# Patient Record
Sex: FEMALE | Race: WHITE | NOT HISPANIC OR LATINO | Employment: UNEMPLOYED | URBAN - METROPOLITAN AREA
[De-identification: names, ages, dates, MRNs, and addresses within clinical notes are randomized per-mention and may not be internally consistent; named-entity substitution may affect disease eponyms.]

---

## 2017-04-19 ENCOUNTER — PREPPED CHART (OUTPATIENT)
Dept: URBAN - METROPOLITAN AREA CLINIC 39 | Facility: CLINIC | Age: 72
End: 2017-04-19

## 2018-04-18 ENCOUNTER — ESTABLISHED COMPREHENSIVE EXAM (OUTPATIENT)
Dept: URBAN - METROPOLITAN AREA CLINIC 39 | Facility: CLINIC | Age: 73
End: 2018-04-18

## 2018-04-18 DIAGNOSIS — H25.811: ICD-10-CM

## 2018-04-18 DIAGNOSIS — H43.812: ICD-10-CM

## 2018-04-18 DIAGNOSIS — H16.223: ICD-10-CM

## 2018-04-18 DIAGNOSIS — H53.2: ICD-10-CM

## 2018-04-18 DIAGNOSIS — H25.812: ICD-10-CM

## 2018-04-18 DIAGNOSIS — H40.013: ICD-10-CM

## 2018-04-18 PROCEDURE — G8427 DOCREV CUR MEDS BY ELIG CLIN: HCPCS

## 2018-04-18 PROCEDURE — 92015 DETERMINE REFRACTIVE STATE: CPT

## 2018-04-18 PROCEDURE — 92014 COMPRE OPH EXAM EST PT 1/>: CPT

## 2018-04-18 PROCEDURE — G8785 BP SCRN NO PERF AT INTERVAL: HCPCS

## 2018-04-18 PROCEDURE — 1036F TOBACCO NON-USER: CPT

## 2018-04-18 ASSESSMENT — KERATOMETRY
OD_AXISANGLE2_DEGREES: 056
OD_AXISANGLE_DEGREES: 146
OS_K2POWER_DIOPTERS: 44.25
OS_K1POWER_DIOPTERS: 44.00
OS_AXISANGLE_DEGREES: 014
OD_K1POWER_DIOPTERS: 43.50
OS_AXISANGLE2_DEGREES: 104
OD_K2POWER_DIOPTERS: 44.25

## 2018-04-18 ASSESSMENT — VISUAL ACUITY
OD_CC: J1
OS_CC: 20/25-1
OU_CC: J1
OD_CC: 20/60
OS_SC: J1+
OD_SC: J1
OU_CC: 20/20
OS_CC: <J12

## 2018-04-18 ASSESSMENT — TONOMETRY
OS_IOP_MMHG: 11
OD_IOP_MMHG: 11

## 2018-06-26 NOTE — PATIENT DISCUSSION
Glasses will not give pt the best vision. He could try to RGP contacts and that will give him better vision than contacts.

## 2018-10-17 ENCOUNTER — IOP CHECK (OUTPATIENT)
Dept: URBAN - METROPOLITAN AREA CLINIC 39 | Facility: CLINIC | Age: 73
End: 2018-10-17

## 2018-10-17 DIAGNOSIS — H40.013: ICD-10-CM

## 2018-10-17 PROCEDURE — G8427 DOCREV CUR MEDS BY ELIG CLIN: HCPCS

## 2018-10-17 PROCEDURE — G9903 PT SCRN TBCO ID AS NON USER: HCPCS

## 2018-10-17 PROCEDURE — 1036F TOBACCO NON-USER: CPT

## 2018-10-17 PROCEDURE — 92083 EXTENDED VISUAL FIELD XM: CPT

## 2018-10-17 PROCEDURE — 92012 INTRM OPH EXAM EST PATIENT: CPT

## 2018-10-17 PROCEDURE — G8785 BP SCRN NO PERF AT INTERVAL: HCPCS

## 2018-10-17 ASSESSMENT — KERATOMETRY
OD_K2POWER_DIOPTERS: 44.25
OS_K2POWER_DIOPTERS: 44.25
OS_AXISANGLE2_DEGREES: 104
OD_AXISANGLE2_DEGREES: 056
OD_AXISANGLE_DEGREES: 146
OS_K1POWER_DIOPTERS: 44.00
OD_K1POWER_DIOPTERS: 43.50
OS_AXISANGLE_DEGREES: 014

## 2018-10-17 ASSESSMENT — TONOMETRY
OD_IOP_MMHG: 12
OS_IOP_MMHG: 12

## 2019-05-20 ASSESSMENT — KERATOMETRY
OD_K2POWER_DIOPTERS: 44.25
OS_AXISANGLE_DEGREES: 014
OD_AXISANGLE2_DEGREES: 056
OS_AXISANGLE2_DEGREES: 104
OS_K1POWER_DIOPTERS: 44.00
OS_K2POWER_DIOPTERS: 44.25
OD_AXISANGLE_DEGREES: 146
OD_K1POWER_DIOPTERS: 43.50

## 2019-05-21 ENCOUNTER — ESTABLISHED COMPREHENSIVE EXAM (OUTPATIENT)
Dept: URBAN - METROPOLITAN AREA CLINIC 39 | Facility: CLINIC | Age: 74
End: 2019-05-21

## 2019-05-21 DIAGNOSIS — H43.812: ICD-10-CM

## 2019-05-21 DIAGNOSIS — H40.013: ICD-10-CM

## 2019-05-21 DIAGNOSIS — H52.4: ICD-10-CM

## 2019-05-21 DIAGNOSIS — H16.223: ICD-10-CM

## 2019-05-21 DIAGNOSIS — H52.203: ICD-10-CM

## 2019-05-21 DIAGNOSIS — H52.13: ICD-10-CM

## 2019-05-21 DIAGNOSIS — H25.811: ICD-10-CM

## 2019-05-21 DIAGNOSIS — H25.812: ICD-10-CM

## 2019-05-21 PROCEDURE — 92014 COMPRE OPH EXAM EST PT 1/>: CPT

## 2019-05-21 PROCEDURE — 92015 DETERMINE REFRACTIVE STATE: CPT

## 2019-05-21 PROCEDURE — 92133 CPTRZD OPH DX IMG PST SGM ON: CPT

## 2019-05-21 PROCEDURE — 92310-1 LEVEL 1 CONTACT LENS MANAGEMENT

## 2019-05-21 ASSESSMENT — VISUAL ACUITY
OS_SC: J1+
OU_CC: 20/20-2
OD_SC: 20/400
OS_CC: J1
OD_CC: 20/25
OS_CC: 20/20-2
OD_CC: J3
OD_SC: J2
OS_SC: 20/400

## 2019-05-21 ASSESSMENT — TONOMETRY
OS_IOP_MMHG: 13
OD_IOP_MMHG: 13

## 2019-07-05 ASSESSMENT — KERATOMETRY
OD_K2POWER_DIOPTERS: 44.25
OS_K2POWER_DIOPTERS: 44.25
OS_AXISANGLE2_DEGREES: 104
OD_AXISANGLE_DEGREES: 146
OS_K1POWER_DIOPTERS: 44.00
OD_K1POWER_DIOPTERS: 43.50
OS_AXISANGLE_DEGREES: 014
OD_AXISANGLE2_DEGREES: 056

## 2019-07-08 ENCOUNTER — IOP CHECK (OUTPATIENT)
Dept: URBAN - METROPOLITAN AREA CLINIC 40 | Facility: CLINIC | Age: 74
End: 2019-07-08

## 2019-07-08 DIAGNOSIS — H40.013: ICD-10-CM

## 2019-07-08 PROCEDURE — 92083 EXTENDED VISUAL FIELD XM: CPT

## 2019-07-08 PROCEDURE — 92250 FUNDUS PHOTOGRAPHY W/I&R: CPT

## 2019-07-08 PROCEDURE — 92012 INTRM OPH EXAM EST PATIENT: CPT

## 2019-07-08 ASSESSMENT — TONOMETRY
OD_IOP_MMHG: 13
OS_IOP_MMHG: 13

## 2019-07-08 ASSESSMENT — VISUAL ACUITY
OD_CC: 20/30
OS_CC: 20/25

## 2019-07-22 ENCOUNTER — FOLLOW UP (OUTPATIENT)
Dept: URBAN - METROPOLITAN AREA CLINIC 40 | Facility: CLINIC | Age: 74
End: 2019-07-22

## 2019-07-22 DIAGNOSIS — H52.13: ICD-10-CM

## 2019-07-22 PROCEDURE — 92310F

## 2019-07-22 ASSESSMENT — KERATOMETRY
OS_AXISANGLE2_DEGREES: 104
OS_AXISANGLE_DEGREES: 014
OD_K2POWER_DIOPTERS: 44.25
OS_K2POWER_DIOPTERS: 44.25
OS_K1POWER_DIOPTERS: 44.00
OD_K1POWER_DIOPTERS: 43.50
OD_AXISANGLE2_DEGREES: 056
OD_AXISANGLE_DEGREES: 146

## 2019-07-22 ASSESSMENT — VISUAL ACUITY
OD_CC: J3 CL
OD_CC: 20/200 CL

## 2020-01-20 ENCOUNTER — IOP CHECK (OUTPATIENT)
Dept: URBAN - METROPOLITAN AREA CLINIC 40 | Facility: CLINIC | Age: 75
End: 2020-01-20

## 2020-01-20 DIAGNOSIS — H40.013: ICD-10-CM

## 2020-01-20 PROCEDURE — 92133 CPTRZD OPH DX IMG PST SGM ON: CPT

## 2020-01-20 PROCEDURE — 92012 INTRM OPH EXAM EST PATIENT: CPT

## 2020-01-20 ASSESSMENT — KERATOMETRY
OD_K1POWER_DIOPTERS: 43.50
OS_K2POWER_DIOPTERS: 44.25
OS_K1POWER_DIOPTERS: 44.00
OD_K2POWER_DIOPTERS: 44.25
OD_AXISANGLE2_DEGREES: 056
OS_AXISANGLE2_DEGREES: 104
OD_AXISANGLE_DEGREES: 146
OS_AXISANGLE_DEGREES: 014

## 2020-01-20 ASSESSMENT — TONOMETRY
OD_IOP_MMHG: 15
OS_IOP_MMHG: 15

## 2020-01-20 ASSESSMENT — VISUAL ACUITY
OD_CC: 20/30
OS_CC: 20/25+2

## 2020-07-20 ENCOUNTER — ESTABLISHED COMPREHENSIVE EXAM (OUTPATIENT)
Dept: URBAN - METROPOLITAN AREA CLINIC 40 | Facility: CLINIC | Age: 75
End: 2020-07-20

## 2020-07-20 DIAGNOSIS — H52.13: ICD-10-CM

## 2020-07-20 DIAGNOSIS — H25.811: ICD-10-CM

## 2020-07-20 DIAGNOSIS — H52.4: ICD-10-CM

## 2020-07-20 DIAGNOSIS — H40.013: ICD-10-CM

## 2020-07-20 DIAGNOSIS — H25.812: ICD-10-CM

## 2020-07-20 DIAGNOSIS — H52.203: ICD-10-CM

## 2020-07-20 PROCEDURE — 92014 COMPRE OPH EXAM EST PT 1/>: CPT

## 2020-07-20 PROCEDURE — 92083 EXTENDED VISUAL FIELD XM: CPT

## 2020-07-20 PROCEDURE — 92015 DETERMINE REFRACTIVE STATE: CPT

## 2020-07-20 PROCEDURE — 92310-1 LEVEL 1 CONTACT LENS MANAGEMENT

## 2020-07-20 PROCEDURE — 92250 FUNDUS PHOTOGRAPHY W/I&R: CPT

## 2020-07-20 ASSESSMENT — VISUAL ACUITY
OU_SC: 20/200-1
OU_CC: 20/20-2
OU_SC: J1
OD_SC: J6
OS_SC: 20/200
OS_CC: 20/25
OU_CC: J3
OD_BAT: 20/200
OS_CC: J1
OS_SC: J5
OS_BAT: 20/60
OD_SC: 20/200-1

## 2020-07-20 ASSESSMENT — KERATOMETRY
OD_AXISANGLE2_DEGREES: 58
OD_AXISANGLE_DEGREES: 148
OD_K2POWER_DIOPTERS: 44.5
OS_AXISANGLE2_DEGREES: 93
OD_K1POWER_DIOPTERS: 43.75
OS_K2POWER_DIOPTERS: 44.25
OS_AXISANGLE_DEGREES: 3
OS_K1POWER_DIOPTERS: 43.75

## 2020-07-20 ASSESSMENT — TONOMETRY
OS_IOP_MMHG: 16
OD_IOP_MMHG: 15

## 2020-10-14 NOTE — PATIENT DISCUSSION
Gave rx for oral keflex 500mg if nec to help with infection. For now Warm compresses and Maxitrol FRANCES BID OU and expressed in office today. DIsc this might be causing blur of vision with mucus in eye. Call with any worseining.

## 2021-01-07 NOTE — PATIENT DISCUSSION
The IOP is in the target range.  Pt to continue to follow w/ [facility] every 6 months w/ OCT, HVF and optic nerve eval.  If changes, send back to [facility].

## 2021-03-22 NOTE — PATIENT DISCUSSION
The IOP is in the target range.  Pt to continue to follow w/ [facility] every 6 months w/ OCT, HVF and optic nerve eval.  If changes, send back to [facility].

## 2021-07-26 ENCOUNTER — ESTABLISHED COMPREHENSIVE EXAM (OUTPATIENT)
Dept: URBAN - METROPOLITAN AREA CLINIC 40 | Facility: CLINIC | Age: 76
End: 2021-07-26

## 2021-07-26 DIAGNOSIS — H53.2: ICD-10-CM

## 2021-07-26 DIAGNOSIS — H40.013: ICD-10-CM

## 2021-07-26 DIAGNOSIS — H25.811: ICD-10-CM

## 2021-07-26 DIAGNOSIS — H25.812: ICD-10-CM

## 2021-07-26 DIAGNOSIS — H52.4: ICD-10-CM

## 2021-07-26 DIAGNOSIS — H43.812: ICD-10-CM

## 2021-07-26 DIAGNOSIS — H52.13: ICD-10-CM

## 2021-07-26 DIAGNOSIS — H16.223: ICD-10-CM

## 2021-07-26 DIAGNOSIS — H52.203: ICD-10-CM

## 2021-07-26 PROCEDURE — 92133 CPTRZD OPH DX IMG PST SGM ON: CPT

## 2021-07-26 PROCEDURE — 92015 DETERMINE REFRACTIVE STATE: CPT

## 2021-07-26 PROCEDURE — 92014 COMPRE OPH EXAM EST PT 1/>: CPT

## 2021-07-26 ASSESSMENT — KERATOMETRY
OD_AXISANGLE2_DEGREES: 58
OS_AXISANGLE2_DEGREES: 93
OS_AXISANGLE_DEGREES: 3
OS_K2POWER_DIOPTERS: 44.25
OD_AXISANGLE_DEGREES: 148
OS_K1POWER_DIOPTERS: 43.75
OD_K1POWER_DIOPTERS: 43.75
OD_K2POWER_DIOPTERS: 44.5

## 2021-07-26 ASSESSMENT — VISUAL ACUITY
OU_CC: J5 CL
OS_BAT: 20/200
OD_SC: J3
OD_SC: 20/200
OS_SC: J1
OS_SC: 20/200
OD_CC: J5 CL
OU_CC: 20/20 CL
OD_CC: 20/400 CL
OS_CC: 20/25+2 CL
OD_BAT: 20/200

## 2021-07-26 ASSESSMENT — TONOMETRY
OD_IOP_MMHG: 12
OS_IOP_MMHG: 14

## 2021-10-18 ENCOUNTER — IOP CHECK (OUTPATIENT)
Dept: URBAN - METROPOLITAN AREA CLINIC 40 | Facility: CLINIC | Age: 76
End: 2021-10-18

## 2021-10-18 DIAGNOSIS — H40.013: ICD-10-CM

## 2021-10-18 PROCEDURE — 92083 EXTENDED VISUAL FIELD XM: CPT

## 2021-10-18 PROCEDURE — 92012 INTRM OPH EXAM EST PATIENT: CPT

## 2021-10-18 ASSESSMENT — VISUAL ACUITY
OD_CC: 20/25+1
OS_CC: 20/25+2

## 2021-10-18 ASSESSMENT — TONOMETRY
OD_IOP_MMHG: 13
OS_IOP_MMHG: 15

## 2021-10-18 ASSESSMENT — KERATOMETRY
OD_K1POWER_DIOPTERS: 43.75
OS_AXISANGLE_DEGREES: 3
OS_K2POWER_DIOPTERS: 44.25
OD_AXISANGLE_DEGREES: 148
OS_K1POWER_DIOPTERS: 43.75
OS_AXISANGLE2_DEGREES: 93
OD_K2POWER_DIOPTERS: 44.5
OD_AXISANGLE2_DEGREES: 58

## 2022-06-27 ENCOUNTER — COMPREHENSIVE EXAM (OUTPATIENT)
Dept: URBAN - METROPOLITAN AREA CLINIC 40 | Facility: CLINIC | Age: 77
End: 2022-06-27

## 2022-06-27 DIAGNOSIS — H52.203: ICD-10-CM

## 2022-06-27 DIAGNOSIS — H52.4: ICD-10-CM

## 2022-06-27 DIAGNOSIS — H52.13: ICD-10-CM

## 2022-06-27 DIAGNOSIS — H40.013: ICD-10-CM

## 2022-06-27 DIAGNOSIS — H25.813: ICD-10-CM

## 2022-06-27 DIAGNOSIS — H53.2: ICD-10-CM

## 2022-06-27 DIAGNOSIS — H43.812: ICD-10-CM

## 2022-06-27 DIAGNOSIS — H16.223: ICD-10-CM

## 2022-06-27 PROCEDURE — 92015 DETERMINE REFRACTIVE STATE: CPT

## 2022-06-27 PROCEDURE — 92133 CPTRZD OPH DX IMG PST SGM ON: CPT

## 2022-06-27 PROCEDURE — 92014 COMPRE OPH EXAM EST PT 1/>: CPT

## 2022-06-27 ASSESSMENT — KERATOMETRY
OS_K1POWER_DIOPTERS: 43.75
OD_AXISANGLE2_DEGREES: 58
OS_K2POWER_DIOPTERS: 44.25
OD_K2POWER_DIOPTERS: 44.5
OS_AXISANGLE2_DEGREES: 93
OS_AXISANGLE_DEGREES: 3
OD_K1POWER_DIOPTERS: 43.75
OD_AXISANGLE_DEGREES: 148

## 2022-06-27 ASSESSMENT — TONOMETRY
OD_IOP_MMHG: 16
OS_IOP_MMHG: 16

## 2022-06-27 ASSESSMENT — VISUAL ACUITY
OS_SC: J1
OS_CC: 20/25 CL
OU_CC: 20/20 CL
OS_SC: 20/200
OD_SC: J2
OD_CC: J5 CL
OU_CC: J5 CL
OD_SC: 20/200
OU_SC: J1
OU_SC: 20/100
OD_CC: 20/200 CL

## 2022-12-13 NOTE — PROCEDURE NOTE: CLINICAL
PROCEDURE NOTE: SLT #1 OS. Diagnosis: POAG, Moderate. Anesthesia: Topical. Prep: Alphagan 0.15%. Prior to treatment, risks/benefits/alternatives discussed including infection, loss of vision, hemorrhage, cataract, glaucoma, retinal tears or detachment. Lens:  SLT laser lens with goniosol. Power: 1.2mJ. Total applications: 78. Application 516 degrees. Patient tolerated procedure well. There were no complications. Post-op instructions given. Post-op IOP = 14 mmHg. Pennye Hand

## 2023-01-17 NOTE — PATIENT DISCUSSION
Advised regular use of Amsler grid.
Continue current management.
Good postoperative appearance.
Patient given Rx for glasses.
Rec: Epi-Lasek OU with [facility] Goal: deondre.
Recommended observation.
diurnal changes will continue to occur.
follow.
pt can have contacts.
[FreeTextEntry8] : This is a 67M with PMHx of adenamotous colyn polyp (2017), BPH, elevated PSA (2021 benign biopsy), HTN, HLD, microscopic heamturia, hx of provoked PE (2020, no longer on AC) presents with painful hemorrhoid. He notes the pain started 3 days ago with gradual onset but has overall gotten worse. He is uncomfortable sitting/moving around. Denies previous history of hemorrhoids. He denies any blood in stool or urine, pain with bowel movements, fevers, chills, dizziness, abdominal pain, n/v, or other related symptoms. He had a bowel movement this AM with no pain. He tried a warm bath with "wintergreen alcohol" and epsom salts yesterday which he says made it slightly worse. He has not taken any meds to alleviate these symptoms. \par \par Of note, he recently returned from long trip to [facility] where he had long periods of sitting.

## 2023-06-12 ENCOUNTER — COMPREHENSIVE EXAM (OUTPATIENT)
Dept: URBAN - METROPOLITAN AREA CLINIC 40 | Facility: CLINIC | Age: 78
End: 2023-06-12

## 2023-06-12 DIAGNOSIS — H43.812: ICD-10-CM

## 2023-06-12 DIAGNOSIS — H53.2: ICD-10-CM

## 2023-06-12 DIAGNOSIS — H52.13: ICD-10-CM

## 2023-06-12 DIAGNOSIS — H35.371: ICD-10-CM

## 2023-06-12 DIAGNOSIS — H52.203: ICD-10-CM

## 2023-06-12 DIAGNOSIS — H16.223: ICD-10-CM

## 2023-06-12 DIAGNOSIS — H40.013: ICD-10-CM

## 2023-06-12 DIAGNOSIS — H25.813: ICD-10-CM

## 2023-06-12 DIAGNOSIS — H52.4: ICD-10-CM

## 2023-06-12 PROCEDURE — 92015 DETERMINE REFRACTIVE STATE: CPT

## 2023-06-12 PROCEDURE — 92014 COMPRE OPH EXAM EST PT 1/>: CPT

## 2023-06-12 PROCEDURE — 92250 FUNDUS PHOTOGRAPHY W/I&R: CPT

## 2023-06-12 ASSESSMENT — VISUAL ACUITY
OU_CC: 20/25+2 CL
OD_SC: 20/200
OS_CC: 20/25+2 CL
OS_SC: 20/200
OD_CC: 20/200 CL
OS_SC: J2
OD_CC: J4 CL
OU_CC: J4 CL
OD_SC: J4
OU_SC: J2
OU_SC: 20/200

## 2023-06-12 ASSESSMENT — KERATOMETRY
OS_K1POWER_DIOPTERS: 43.75
OD_K1POWER_DIOPTERS: 43.75
OD_K2POWER_DIOPTERS: 44.5
OS_K2POWER_DIOPTERS: 44.25
OS_AXISANGLE2_DEGREES: 93
OD_AXISANGLE_DEGREES: 148
OD_AXISANGLE2_DEGREES: 58
OS_AXISANGLE_DEGREES: 3

## 2023-06-12 ASSESSMENT — TONOMETRY
OD_IOP_MMHG: 12
OS_IOP_MMHG: 12

## 2023-06-19 ENCOUNTER — FOLLOW UP (OUTPATIENT)
Dept: URBAN - METROPOLITAN AREA CLINIC 40 | Facility: CLINIC | Age: 78
End: 2023-06-19

## 2023-06-19 DIAGNOSIS — H40.013: ICD-10-CM

## 2023-06-19 PROCEDURE — 92083 EXTENDED VISUAL FIELD XM: CPT

## 2023-06-19 PROCEDURE — 92012 INTRM OPH EXAM EST PATIENT: CPT

## 2023-06-19 RX ORDER — LATANOPROST 50 UG/ML
1 SOLUTION/ DROPS OPHTHALMIC EVERY EVENING
Start: 2023-06-19

## 2023-06-19 ASSESSMENT — KERATOMETRY
OD_AXISANGLE2_DEGREES: 58
OD_K2POWER_DIOPTERS: 44.5
OS_AXISANGLE2_DEGREES: 93
OS_AXISANGLE_DEGREES: 3
OD_K1POWER_DIOPTERS: 43.75
OD_AXISANGLE_DEGREES: 148
OS_K2POWER_DIOPTERS: 44.25
OS_K1POWER_DIOPTERS: 43.75

## 2023-06-19 ASSESSMENT — VISUAL ACUITY
OU_CC: 20/20-1
OS_CC: 20/20-1
OD_CC: 20/30-2

## 2023-06-19 ASSESSMENT — TONOMETRY
OD_IOP_MMHG: 10
OS_IOP_MMHG: 10

## 2023-06-27 ENCOUNTER — CONSULTATION/EVALUATION (OUTPATIENT)
Dept: URBAN - METROPOLITAN AREA CLINIC 39 | Facility: CLINIC | Age: 78
End: 2023-06-27

## 2023-06-27 DIAGNOSIS — H18.513: ICD-10-CM

## 2023-06-27 DIAGNOSIS — H40.013: ICD-10-CM

## 2023-06-27 DIAGNOSIS — H16.223: ICD-10-CM

## 2023-06-27 DIAGNOSIS — H25.813: ICD-10-CM

## 2023-06-27 DIAGNOSIS — H35.371: ICD-10-CM

## 2023-06-27 DIAGNOSIS — H53.2: ICD-10-CM

## 2023-06-27 PROCEDURE — 92286 ANT SGM IMG I&R SPECLR MIC: CPT

## 2023-06-27 PROCEDURE — 99202 OFFICE O/P NEW SF 15 MIN: CPT

## 2023-06-27 PROCEDURE — 92134 CPTRZ OPH DX IMG PST SGM RTA: CPT

## 2023-06-27 PROCEDURE — V2799PMN IMPRIMIS PRED-MOXI-NEPAF 5ML

## 2023-06-27 ASSESSMENT — VISUAL ACUITY
OD_SC: 20/200
OS_BAT: 20/70
OD_SC: J4
OS_CC: 20/25
OD_BAT: 20/200
OD_RAM: 20/20
OS_AM: 20/20
OS_SC: 20/200
OD_CC: 20/50
OS_SC: J2

## 2023-06-27 ASSESSMENT — TONOMETRY
OS_IOP_MMHG: 11
OD_IOP_MMHG: 10

## 2023-06-27 ASSESSMENT — KERATOMETRY
OS_AXISANGLE2_DEGREES: 93
OS_K1POWER_DIOPTERS: 43.75
OS_AXISANGLE_DEGREES: 3
OD_AXISANGLE_DEGREES: 148
OD_K1POWER_DIOPTERS: 43.75
OS_K2POWER_DIOPTERS: 44.25
OD_K2POWER_DIOPTERS: 44.5
OD_AXISANGLE2_DEGREES: 58

## 2023-07-18 ENCOUNTER — PRE-OP/H&P (OUTPATIENT)
Dept: URBAN - METROPOLITAN AREA SURGERY 14 | Facility: SURGERY | Age: 78
End: 2023-07-18

## 2023-07-18 ENCOUNTER — SURGERY/PROCEDURE (OUTPATIENT)
Dept: URBAN - METROPOLITAN AREA CLINIC 39 | Facility: CLINIC | Age: 78
End: 2023-07-18

## 2023-07-18 DIAGNOSIS — H25.813: ICD-10-CM

## 2023-07-18 PROCEDURE — 66999LNSR LENSAR LASER FOR CAT SX

## 2023-07-18 PROCEDURE — 65772LRI LRI DURING CAT SX

## 2023-07-18 PROCEDURE — 66984CV REMOVE CATARACT, INSERT LENS, CUSTOM VISION

## 2023-07-18 PROCEDURE — 99211T TECH SERVICE

## 2023-07-18 ASSESSMENT — KERATOMETRY
OS_K2POWER_DIOPTERS: 44.25
OD_AXISANGLE_DEGREES: 148
OS_AXISANGLE2_DEGREES: 93
OD_K1POWER_DIOPTERS: 43.75
OD_K2POWER_DIOPTERS: 44.5
OD_AXISANGLE2_DEGREES: 58
OS_AXISANGLE_DEGREES: 3
OS_K1POWER_DIOPTERS: 43.75

## 2023-07-19 ENCOUNTER — POST-OP (OUTPATIENT)
Dept: URBAN - METROPOLITAN AREA CLINIC 39 | Facility: CLINIC | Age: 78
End: 2023-07-19

## 2023-07-19 DIAGNOSIS — Z96.1: ICD-10-CM

## 2023-07-19 PROCEDURE — 99024 POSTOP FOLLOW-UP VISIT: CPT

## 2023-07-19 ASSESSMENT — KERATOMETRY
OD_AXISANGLE2_DEGREES: 58
OS_AXISANGLE2_DEGREES: 93
OD_AXISANGLE_DEGREES: 148
OD_K1POWER_DIOPTERS: 43.75
OD_AXISANGLE_DEGREES: 148
OS_K2POWER_DIOPTERS: 44.25
OD_K1POWER_DIOPTERS: 43.75
OD_K2POWER_DIOPTERS: 44.5
OS_AXISANGLE_DEGREES: 3
OS_K1POWER_DIOPTERS: 43.75
OS_K1POWER_DIOPTERS: 43.75
OD_AXISANGLE2_DEGREES: 58
OD_K2POWER_DIOPTERS: 44.5
OS_K2POWER_DIOPTERS: 44.25
OS_AXISANGLE2_DEGREES: 93
OS_AXISANGLE_DEGREES: 3

## 2023-07-19 ASSESSMENT — VISUAL ACUITY
OS_SC: 20/25+1
OS_SC: J10

## 2023-07-19 ASSESSMENT — TONOMETRY
OS_IOP_MMHG: 15
OD_IOP_MMHG: 12

## 2023-07-25 ENCOUNTER — POST OP/EVAL OF SECOND EYE (OUTPATIENT)
Dept: URBAN - METROPOLITAN AREA CLINIC 39 | Facility: CLINIC | Age: 78
End: 2023-07-25

## 2023-07-25 ENCOUNTER — SURGERY/PROCEDURE (OUTPATIENT)
Dept: URBAN - METROPOLITAN AREA CLINIC 39 | Facility: CLINIC | Age: 78
End: 2023-07-25

## 2023-07-25 DIAGNOSIS — H26.492: ICD-10-CM

## 2023-07-25 DIAGNOSIS — H25.811: ICD-10-CM

## 2023-07-25 DIAGNOSIS — H43.812: ICD-10-CM

## 2023-07-25 DIAGNOSIS — H16.223: ICD-10-CM

## 2023-07-25 DIAGNOSIS — Z96.1: ICD-10-CM

## 2023-07-25 DIAGNOSIS — H40.013: ICD-10-CM

## 2023-07-25 DIAGNOSIS — H35.371: ICD-10-CM

## 2023-07-25 DIAGNOSIS — H53.2: ICD-10-CM

## 2023-07-25 PROCEDURE — 66984CV REMOVE CATARACT, INSERT LENS, CUSTOM VISION

## 2023-07-25 PROCEDURE — 99024 POSTOP FOLLOW-UP VISIT: CPT

## 2023-07-25 PROCEDURE — 66999LNSR LENSAR LASER FOR CAT SX

## 2023-07-25 PROCEDURE — 92012 INTRM OPH EXAM EST PATIENT: CPT

## 2023-07-25 ASSESSMENT — KERATOMETRY
OS_K2POWER_DIOPTERS: 44.25
OD_AXISANGLE2_DEGREES: 58
OS_K1POWER_DIOPTERS: 43.75
OD_AXISANGLE_DEGREES: 148
OD_K2POWER_DIOPTERS: 44.5
OS_AXISANGLE_DEGREES: 3
OD_K1POWER_DIOPTERS: 43.75
OS_AXISANGLE2_DEGREES: 93

## 2023-07-25 ASSESSMENT — VISUAL ACUITY
OS_PH: 20/50
OD_SC: 20/200
OD_BAT: 20/200
OS_SC: 20/70-2
OD_SC: J4
OD_RAM: 20/20
OD_CC: 20/50

## 2023-07-25 ASSESSMENT — TONOMETRY: OS_IOP_MMHG: 11

## 2023-07-25 ASSESSMENT — PACHYMETRY
OD_CT_UM: 464
OS_CT_UM: 477

## 2023-07-26 ENCOUNTER — POST-OP (OUTPATIENT)
Dept: URBAN - METROPOLITAN AREA CLINIC 39 | Facility: CLINIC | Age: 78
End: 2023-07-26

## 2023-07-26 DIAGNOSIS — Z96.1: ICD-10-CM

## 2023-07-26 PROCEDURE — 99024 POSTOP FOLLOW-UP VISIT: CPT

## 2023-07-26 ASSESSMENT — KERATOMETRY
OS_K2POWER_DIOPTERS: 44.25
OD_K2POWER_DIOPTERS: 44.5
OS_AXISANGLE_DEGREES: 3
OS_AXISANGLE2_DEGREES: 93
OD_K1POWER_DIOPTERS: 43.75
OD_AXISANGLE2_DEGREES: 58
OD_AXISANGLE_DEGREES: 148
OS_K1POWER_DIOPTERS: 43.75

## 2023-07-26 ASSESSMENT — VISUAL ACUITY
OD_SC: 20/60
OS_SC: J10
OS_SC: 20/40-1
OD_SC: J10
OS_PH: 20/30

## 2023-07-26 ASSESSMENT — TONOMETRY
OS_IOP_MMHG: 14
OD_IOP_MMHG: 14

## 2023-07-27 ASSESSMENT — KERATOMETRY
OS_K1POWER_DIOPTERS: 43.75
OD_K2POWER_DIOPTERS: 44.5
OS_AXISANGLE2_DEGREES: 93
OD_AXISANGLE2_DEGREES: 58
OS_AXISANGLE_DEGREES: 3
OD_AXISANGLE_DEGREES: 148
OS_K2POWER_DIOPTERS: 44.25
OD_K1POWER_DIOPTERS: 43.75

## 2023-08-07 ENCOUNTER — POST-OP (OUTPATIENT)
Dept: URBAN - METROPOLITAN AREA CLINIC 40 | Facility: CLINIC | Age: 78
End: 2023-08-07

## 2023-08-07 DIAGNOSIS — Z96.1: ICD-10-CM

## 2023-08-07 PROCEDURE — 99024 POSTOP FOLLOW-UP VISIT: CPT

## 2023-08-07 ASSESSMENT — KERATOMETRY
OD_AXISANGLE_DEGREES: 148
OD_AXISANGLE2_DEGREES: 58
OS_AXISANGLE_DEGREES: 3
OS_K1POWER_DIOPTERS: 43.75
OD_K1POWER_DIOPTERS: 43.75
OD_K2POWER_DIOPTERS: 44.5
OS_K2POWER_DIOPTERS: 44.25
OS_AXISANGLE2_DEGREES: 93

## 2023-08-07 ASSESSMENT — TONOMETRY
OS_IOP_MMHG: 14
OD_IOP_MMHG: 14

## 2023-08-07 ASSESSMENT — VISUAL ACUITY
OS_SC: >J12
OD_SC: >J12
OD_PH: 20/50
OD_SC: 20/200
OU_SC: >J12
OS_PH: 20/25+2
OS_SC: 20/40+2
OU_SC: 20/20 DOUBLE

## 2023-08-10 ENCOUNTER — FOLLOW UP (OUTPATIENT)
Dept: URBAN - METROPOLITAN AREA CLINIC 39 | Facility: CLINIC | Age: 78
End: 2023-08-10

## 2023-08-10 DIAGNOSIS — Z96.1: ICD-10-CM

## 2023-08-10 PROCEDURE — 99024 POSTOP FOLLOW-UP VISIT: CPT

## 2023-08-10 ASSESSMENT — KERATOMETRY
OS_AXISANGLE2_DEGREES: 93
OS_K1POWER_DIOPTERS: 43.75
OS_K2POWER_DIOPTERS: 44.25
OS_AXISANGLE_DEGREES: 3
OD_K2POWER_DIOPTERS: 44.5
OD_K1POWER_DIOPTERS: 43.75
OD_AXISANGLE_DEGREES: 148
OD_AXISANGLE2_DEGREES: 58

## 2023-08-10 ASSESSMENT — VISUAL ACUITY: OD_CC: 20/200 BCL

## 2023-08-17 NOTE — PATIENT DISCUSSION
Patient understands condition, prognosis and need for follow up care. Skin Substitute Text: The defect edges were debeveled with a #15 scalpel blade.  Given the location of the defect, shape of the defect and the proximity to free margins a skin substitute graft was deemed most appropriate.  The graft material was trimmed to fit the size of the defect. The graft was then placed in the primary defect and oriented appropriately.

## 2023-08-21 ENCOUNTER — POST-OP (OUTPATIENT)
Dept: URBAN - METROPOLITAN AREA CLINIC 40 | Facility: CLINIC | Age: 78
End: 2023-08-21

## 2023-08-21 ENCOUNTER — POST-OP (OUTPATIENT)
Dept: URBAN - METROPOLITAN AREA CLINIC 39 | Facility: CLINIC | Age: 78
End: 2023-08-21

## 2023-08-21 DIAGNOSIS — H53.2: ICD-10-CM

## 2023-08-21 DIAGNOSIS — Z96.1: ICD-10-CM

## 2023-08-21 DIAGNOSIS — H16.223: ICD-10-CM

## 2023-08-21 DIAGNOSIS — H40.013: ICD-10-CM

## 2023-08-21 DIAGNOSIS — H26.493: ICD-10-CM

## 2023-08-21 DIAGNOSIS — H43.812: ICD-10-CM

## 2023-08-21 DIAGNOSIS — H35.371: ICD-10-CM

## 2023-08-21 DIAGNOSIS — Z97.3: ICD-10-CM

## 2023-08-21 DIAGNOSIS — H52.7: ICD-10-CM

## 2023-08-21 PROCEDURE — 99024 POSTOP FOLLOW-UP VISIT: CPT

## 2023-08-21 ASSESSMENT — VISUAL ACUITY
OD_SC: 20/100
OU_SC: >J12
OS_SC: 20/20
OS_SC: J12
OS_SC: >J12
OD_CC: J12 CL
OD_SC: J12
OD_SC: 20/80
OU_SC: 20/20
OD_SC: >J12
OS_SC: 20/20
OD_CC: 20/200 CL

## 2023-08-21 ASSESSMENT — KERATOMETRY
OD_AXISANGLE_DEGREES: 148
OS_K1POWER_DIOPTERS: 43.75
OD_K2POWER_DIOPTERS: 44.5
OD_AXISANGLE2_DEGREES: 58
OS_AXISANGLE2_DEGREES: 93
OD_K1POWER_DIOPTERS: 43.75
OS_K2POWER_DIOPTERS: 44.25
OS_AXISANGLE_DEGREES: 3

## 2023-08-21 ASSESSMENT — TONOMETRY
OS_IOP_MMHG: 11
OS_IOP_MMHG: 11
OD_IOP_MMHG: 13

## 2023-09-08 ENCOUNTER — POST-OP (OUTPATIENT)
Dept: URBAN - METROPOLITAN AREA CLINIC 39 | Facility: CLINIC | Age: 78
End: 2023-09-08

## 2023-09-08 DIAGNOSIS — H35.371: ICD-10-CM

## 2023-09-08 DIAGNOSIS — H52.7: ICD-10-CM

## 2023-09-08 DIAGNOSIS — H04.123: ICD-10-CM

## 2023-09-08 DIAGNOSIS — Z96.1: ICD-10-CM

## 2023-09-08 PROCEDURE — 92286 ANT SGM IMG I&R SPECLR MIC: CPT

## 2023-09-08 PROCEDURE — 92134 CPTRZ OPH DX IMG PST SGM RTA: CPT

## 2023-09-08 PROCEDURE — 92136TC INTERFEROMETRY - TECHNICAL COMPONENT

## 2023-09-08 PROCEDURE — V2799PM PRED MOXI

## 2023-09-08 PROCEDURE — 92025NC COMP. CORNEAL TOPO, UNI OR BILAT,

## 2023-09-08 PROCEDURE — 99024 POSTOP FOLLOW-UP VISIT: CPT

## 2023-09-08 RX ORDER — LIFITEGRAST 50 MG/ML: 1 SOLUTION/ DROPS OPHTHALMIC TWICE A DAY

## 2023-09-08 ASSESSMENT — VISUAL ACUITY
OD_SC: 20/100+1
OS_BAT: 20/25
OU_SC: 20/20-2
OD_BAT: 20/60
OS_SC: 20/20+1

## 2023-09-08 ASSESSMENT — TONOMETRY
OD_IOP_MMHG: 10
OS_IOP_MMHG: 12

## 2023-09-13 ENCOUNTER — SURGERY/PROCEDURE (OUTPATIENT)
Dept: URBAN - METROPOLITAN AREA CLINIC 39 | Facility: CLINIC | Age: 78
End: 2023-09-13

## 2023-09-13 DIAGNOSIS — H52.7: ICD-10-CM

## 2023-09-13 PROCEDURE — 66999SPP EPI LASEK S/P ADVANCED / CUSTOM < 12 MONTHS

## 2023-09-14 ENCOUNTER — POST-OP (OUTPATIENT)
Dept: URBAN - METROPOLITAN AREA CLINIC 39 | Facility: CLINIC | Age: 78
End: 2023-09-14

## 2023-09-14 DIAGNOSIS — Z98.890: ICD-10-CM

## 2023-09-14 PROCEDURE — 99024 POSTOP FOLLOW-UP VISIT: CPT

## 2023-09-14 ASSESSMENT — VISUAL ACUITY
OD_SC: <J10
OD_SC: 20/100

## 2023-09-20 ENCOUNTER — POST-OP (OUTPATIENT)
Dept: URBAN - METROPOLITAN AREA CLINIC 39 | Facility: CLINIC | Age: 78
End: 2023-09-20

## 2023-09-20 DIAGNOSIS — Z98.890: ICD-10-CM

## 2023-09-20 PROCEDURE — 6699955 NON-COMANAGED LASIK PO

## 2023-09-20 ASSESSMENT — VISUAL ACUITY
OS_SC: 20/20
OD_PH: 20/200 BCL
OD_SC: 20/400 BCL
OU_SC: 20/20

## 2023-10-24 ENCOUNTER — POST-OP (OUTPATIENT)
Dept: URBAN - METROPOLITAN AREA CLINIC 39 | Facility: CLINIC | Age: 78
End: 2023-10-24

## 2023-10-24 DIAGNOSIS — Z98.890: ICD-10-CM

## 2023-10-24 PROCEDURE — 99024 POSTOP FOLLOW-UP VISIT: CPT

## 2023-10-24 RX ORDER — VALACYCLOVIR HYDROCHLORIDE 500 MG/1: 1 TABLET ORAL

## 2023-10-24 RX ORDER — TOBRAMYCIN 3 MG/ML
1 SOLUTION/ DROPS OPHTHALMIC
Start: 2023-10-24

## 2023-10-24 ASSESSMENT — VISUAL ACUITY
OS_SC: 20/20
OD_SC: J8
OD_SC: 20/200
OS_SC: J10

## 2023-10-24 ASSESSMENT — TONOMETRY: OS_IOP_MMHG: 10

## 2023-10-31 ENCOUNTER — POST-OP (OUTPATIENT)
Dept: URBAN - METROPOLITAN AREA CLINIC 39 | Facility: CLINIC | Age: 78
End: 2023-10-31

## 2023-10-31 DIAGNOSIS — Z98.890: ICD-10-CM

## 2023-10-31 PROCEDURE — 99024 POSTOP FOLLOW-UP VISIT: CPT

## 2023-10-31 RX ORDER — PREDNISOLONE ACETATE 10 MG/ML: 1 SUSPENSION/ DROPS OPHTHALMIC

## 2023-10-31 ASSESSMENT — VISUAL ACUITY
OS_SC: 20/20
OD_SC: J18
OU_SC: 20/20
OU_SC: J16
OS_SC: J16
OD_SC: 20/100

## 2023-10-31 ASSESSMENT — TONOMETRY: OS_IOP_MMHG: 11

## 2023-11-29 ENCOUNTER — POST-OP (OUTPATIENT)
Dept: URBAN - METROPOLITAN AREA CLINIC 39 | Facility: CLINIC | Age: 78
End: 2023-11-29

## 2023-11-29 DIAGNOSIS — Z98.890: ICD-10-CM

## 2023-11-29 PROCEDURE — 99024 POSTOP FOLLOW-UP VISIT: CPT

## 2023-11-29 ASSESSMENT — VISUAL ACUITY
OU_CC: J1+
OS_SC: 20/20-1
OS_SC: J12 "BLURRY"
OD_SC: 20/200
OS_CC: J2
OD_CC: J3

## 2023-11-29 ASSESSMENT — TONOMETRY
OS_IOP_MMHG: 15
OD_IOP_MMHG: 13

## 2023-12-11 ENCOUNTER — FOLLOW UP (OUTPATIENT)
Dept: URBAN - METROPOLITAN AREA CLINIC 40 | Facility: CLINIC | Age: 78
End: 2023-12-11

## 2023-12-11 DIAGNOSIS — Z98.890: ICD-10-CM

## 2023-12-11 PROCEDURE — 92015GRNC REFRACTION GLASSES RECHECK - NO CHARGE

## 2023-12-11 ASSESSMENT — VISUAL ACUITY
OU_SC: J10
OD_SC: J10
OS_SC: 20/20
OU_SC: 20/20-2
OD_PH: 20/40-2
OS_SC: J10
OD_SC: 20/200

## 2024-01-15 ENCOUNTER — POST-OP (OUTPATIENT)
Dept: URBAN - METROPOLITAN AREA CLINIC 40 | Facility: CLINIC | Age: 79
End: 2024-01-15

## 2024-01-15 DIAGNOSIS — H53.2: ICD-10-CM

## 2024-01-15 PROCEDURE — 92012 INTRM OPH EXAM EST PATIENT: CPT

## 2024-01-15 ASSESSMENT — VISUAL ACUITY
OD_CC: 20/40
OS_CC: J2
OD_CC: J3
OU_CC: J2
OU_CC: 20/20
OS_CC: 20/20

## 2024-01-15 ASSESSMENT — TONOMETRY
OS_IOP_MMHG: 10
OD_IOP_MMHG: 12

## 2024-05-30 ENCOUNTER — CONSULTATION/EVALUATION (OUTPATIENT)
Dept: URBAN - METROPOLITAN AREA CLINIC 35 | Facility: CLINIC | Age: 79
End: 2024-05-30

## 2024-05-30 DIAGNOSIS — H40.013: ICD-10-CM

## 2024-05-30 DIAGNOSIS — H35.30: ICD-10-CM

## 2024-05-30 DIAGNOSIS — H43.812: ICD-10-CM

## 2024-05-30 DIAGNOSIS — H04.123: ICD-10-CM

## 2024-05-30 DIAGNOSIS — H35.371: ICD-10-CM

## 2024-05-30 DIAGNOSIS — H52.13: ICD-10-CM

## 2024-05-30 PROCEDURE — 99214 OFFICE O/P EST MOD 30 MIN: CPT

## 2024-05-30 PROCEDURE — 92235 FLUORESCEIN ANGRPH MLTIFRAME: CPT

## 2024-05-30 PROCEDURE — 92250 FUNDUS PHOTOGRAPHY W/I&R: CPT

## 2024-05-30 ASSESSMENT — VISUAL ACUITY
OD_PH: 20/70
OD_SC: 20/200
OS_SC: 20/20

## 2024-05-30 ASSESSMENT — TONOMETRY
OD_IOP_MMHG: 10
OS_IOP_MMHG: 11

## 2024-06-24 ENCOUNTER — COMPREHENSIVE EXAM (OUTPATIENT)
Dept: URBAN - METROPOLITAN AREA CLINIC 40 | Facility: CLINIC | Age: 79
End: 2024-06-24

## 2024-06-24 DIAGNOSIS — H35.371: ICD-10-CM

## 2024-06-24 DIAGNOSIS — H53.2: ICD-10-CM

## 2024-06-24 DIAGNOSIS — H26.493: ICD-10-CM

## 2024-06-24 DIAGNOSIS — H52.203: ICD-10-CM

## 2024-06-24 DIAGNOSIS — H04.123: ICD-10-CM

## 2024-06-24 DIAGNOSIS — H43.812: ICD-10-CM

## 2024-06-24 DIAGNOSIS — H52.4: ICD-10-CM

## 2024-06-24 DIAGNOSIS — H16.223: ICD-10-CM

## 2024-06-24 DIAGNOSIS — H40.013: ICD-10-CM

## 2024-06-24 DIAGNOSIS — H52.13: ICD-10-CM

## 2024-06-24 PROCEDURE — 92015 DETERMINE REFRACTIVE STATE: CPT

## 2024-06-24 PROCEDURE — 92014 COMPRE OPH EXAM EST PT 1/>: CPT

## 2024-06-24 PROCEDURE — 92250 FUNDUS PHOTOGRAPHY W/I&R: CPT | Mod: 25

## 2024-06-24 ASSESSMENT — VISUAL ACUITY
OD_CC: J10
OD_CC: 20/50
OU_CC: J2
OS_CC: J1+
OU_SC: J8
OS_CC: 20/25-2
OD_SC: 20/200
OD_SC: J10
OS_SC: 20/25
OS_SC: J12

## 2024-06-24 ASSESSMENT — TONOMETRY
OS_IOP_MMHG: 09
OD_IOP_MMHG: 08

## 2024-08-29 ENCOUNTER — COMPREHENSIVE EXAM (OUTPATIENT)
Dept: URBAN - METROPOLITAN AREA CLINIC 35 | Facility: CLINIC | Age: 79
End: 2024-08-29

## 2024-08-29 DIAGNOSIS — H35.30: ICD-10-CM

## 2024-08-29 DIAGNOSIS — H53.2: ICD-10-CM

## 2024-08-29 DIAGNOSIS — H35.371: ICD-10-CM

## 2024-08-29 DIAGNOSIS — H40.013: ICD-10-CM

## 2024-08-29 DIAGNOSIS — H52.13: ICD-10-CM

## 2024-08-29 DIAGNOSIS — H04.123: ICD-10-CM

## 2024-08-29 DIAGNOSIS — H43.813: ICD-10-CM

## 2024-08-29 PROCEDURE — 92134 CPTRZ OPH DX IMG PST SGM RTA: CPT | Mod: NC

## 2024-08-29 PROCEDURE — 92250 FUNDUS PHOTOGRAPHY W/I&R: CPT

## 2024-08-29 PROCEDURE — 99213 OFFICE O/P EST LOW 20 MIN: CPT

## 2024-08-29 ASSESSMENT — TONOMETRY
OS_IOP_MMHG: 8
OD_IOP_MMHG: 5

## 2024-08-29 ASSESSMENT — VISUAL ACUITY
OS_CC: 20/25
OD_CC: 20/200
OD_PH: 20/100

## 2025-06-17 NOTE — PATIENT DISCUSSION
Glasses will not give pt the best vision. He could try to RGP contacts and that will give him better vision than contacts. no

## 2025-06-30 ENCOUNTER — COMPREHENSIVE EXAM (OUTPATIENT)
Age: 80
End: 2025-06-30

## 2025-06-30 DIAGNOSIS — H52.13: ICD-10-CM

## 2025-06-30 DIAGNOSIS — H43.812: ICD-10-CM

## 2025-06-30 DIAGNOSIS — H53.2: ICD-10-CM

## 2025-06-30 DIAGNOSIS — H16.223: ICD-10-CM

## 2025-06-30 DIAGNOSIS — H26.493: ICD-10-CM

## 2025-06-30 DIAGNOSIS — H35.371: ICD-10-CM

## 2025-06-30 DIAGNOSIS — H40.013: ICD-10-CM

## 2025-06-30 PROCEDURE — 92133 CPTRZD OPH DX IMG PST SGM ON: CPT | Mod: 25

## 2025-06-30 PROCEDURE — 92014 COMPRE OPH EXAM EST PT 1/>: CPT

## 2025-06-30 PROCEDURE — 92015 DETERMINE REFRACTIVE STATE: CPT

## 2025-08-07 ENCOUNTER — FOLLOW UP (OUTPATIENT)
Age: 80
End: 2025-08-07

## 2025-08-07 DIAGNOSIS — H40.013: ICD-10-CM

## 2025-08-07 PROCEDURE — 92083 EXTENDED VISUAL FIELD XM: CPT | Mod: 25

## 2025-08-07 PROCEDURE — 92012 INTRM OPH EXAM EST PATIENT: CPT
